# Patient Record
Sex: MALE | URBAN - METROPOLITAN AREA
[De-identification: names, ages, dates, MRNs, and addresses within clinical notes are randomized per-mention and may not be internally consistent; named-entity substitution may affect disease eponyms.]

---

## 2023-03-31 ENCOUNTER — TELEPHONE (OUTPATIENT)
Dept: PRIMARY CARE | Facility: CLINIC | Age: 48
End: 2023-03-31
Payer: COMMERCIAL

## 2023-03-31 NOTE — TELEPHONE ENCOUNTER
Rx Refill Request Telephone Encounter    Name:  Avtar Arias  :  [id number]  Medication Name:  Lisinopril 20mg            Specific Pharmacy location:  [facility]  Date of last appointment:  n/a  Date of next appointment:  n/a  Best number to reach patient:  [phone]

## 2023-04-10 DIAGNOSIS — I10 HYPERTENSION, BENIGN: ICD-10-CM

## 2023-04-11 ENCOUNTER — TELEPHONE (OUTPATIENT)
Dept: PRIMARY CARE | Facility: CLINIC | Age: 48
End: 2023-04-11
Payer: COMMERCIAL

## 2023-04-11 PROBLEM — I10 HYPERTENSION, BENIGN: Status: ACTIVE | Noted: 2023-04-11

## 2023-04-11 RX ORDER — LISINOPRIL 20 MG/1
20 TABLET ORAL DAILY
Qty: 90 TABLET | Refills: 3 | Status: SHIPPED | OUTPATIENT
Start: 2023-04-11 | End: 2024-05-01 | Stop reason: SDUPTHER

## 2023-04-11 RX ORDER — LISINOPRIL 20 MG/1
20 TABLET ORAL DAILY
COMMUNITY
End: 2023-04-11 | Stop reason: SDUPTHER

## 2023-04-11 NOTE — TELEPHONE ENCOUNTER
Pt called wondering if there's an update on refill on Linsiprol 20mg ?  I can get him on the schedule for med refill if need be!  Please advise, thank you!

## 2023-05-01 ENCOUNTER — OFFICE VISIT (OUTPATIENT)
Dept: PRIMARY CARE | Facility: CLINIC | Age: 48
End: 2023-05-01
Payer: COMMERCIAL

## 2023-05-01 VITALS
DIASTOLIC BLOOD PRESSURE: 80 MMHG | RESPIRATION RATE: 16 BRPM | WEIGHT: 214 LBS | SYSTOLIC BLOOD PRESSURE: 140 MMHG | HEART RATE: 76 BPM | BODY MASS INDEX: 26.61 KG/M2 | HEIGHT: 75 IN | TEMPERATURE: 97.9 F

## 2023-05-01 DIAGNOSIS — Z12.11 ENCOUNTER FOR SCREENING FOR MALIGNANT NEOPLASM OF COLON: ICD-10-CM

## 2023-05-01 DIAGNOSIS — I10 HYPERTENSION, BENIGN: Primary | ICD-10-CM

## 2023-05-01 PROBLEM — M41.9 SCOLIOSIS OF THORACOLUMBAR SPINE: Status: ACTIVE | Noted: 2023-05-01

## 2023-05-01 PROCEDURE — 1036F TOBACCO NON-USER: CPT | Performed by: FAMILY MEDICINE

## 2023-05-01 PROCEDURE — 3077F SYST BP >= 140 MM HG: CPT | Performed by: FAMILY MEDICINE

## 2023-05-01 PROCEDURE — 99213 OFFICE O/P EST LOW 20 MIN: CPT | Performed by: FAMILY MEDICINE

## 2023-05-01 PROCEDURE — 3079F DIAST BP 80-89 MM HG: CPT | Performed by: FAMILY MEDICINE

## 2023-05-01 ASSESSMENT — ENCOUNTER SYMPTOMS
BLURRED VISION: 0
SHORTNESS OF BREATH: 0
PALPITATIONS: 0
HEADACHES: 0
HYPERTENSION: 1

## 2023-05-01 NOTE — ASSESSMENT & PLAN NOTE
This is at goal after sitting quietly in the office but he will work on less salt and caffein and increasing exercise

## 2023-05-01 NOTE — PROGRESS NOTES
Subjective   Avtar Arias is a 48 y.o. male who presents for Hypertension.  Hypertension  This is a chronic problem. Progression since onset: stable. The problem is controlled. Pertinent negatives include no anxiety, blurred vision, chest pain, headaches, palpitations, peripheral edema or shortness of breath. Past treatments include ACE inhibitors. The current treatment provides significant improvement. There are no compliance problems.      Review of Systems   Eyes:  Negative for blurred vision.   Respiratory:  Negative for shortness of breath.    Cardiovascular:  Negative for chest pain and palpitations.   Neurological:  Negative for headaches.     Visit Vitals  /80   Pulse 76   Temp 36.6 °C (97.9 °F)   Resp 16      Objective   Physical Exam  Constitutional:       Appearance: Normal appearance.   HENT:      Head: Normocephalic.   Eyes:      Conjunctiva/sclera: Conjunctivae normal.   Cardiovascular:      Rate and Rhythm: Normal rate and regular rhythm.   Pulmonary:      Effort: Pulmonary effort is normal.      Breath sounds: Normal breath sounds.   Musculoskeletal:      Cervical back: Neck supple.   Skin:     General: Skin is warm and dry.   Neurological:      Mental Status: He is alert.         Assessment/Plan    Problem List Items Addressed This Visit       Hypertension, benign - Primary     This is at goal after sitting quietly in the office but he will work on less salt and caffein and increasing exercise         Relevant Orders    CBC    Basic Metabolic Panel    Lipid Panel    Hepatitis C Antibody     Other Visit Diagnoses       Encounter for screening for malignant neoplasm of colon        Relevant Orders    Cologuard® colon cancer screening

## 2024-05-01 ENCOUNTER — TELEPHONE (OUTPATIENT)
Dept: PRIMARY CARE | Facility: CLINIC | Age: 49
End: 2024-05-01
Payer: COMMERCIAL

## 2024-05-01 DIAGNOSIS — I10 HYPERTENSION, BENIGN: ICD-10-CM

## 2024-05-01 RX ORDER — LISINOPRIL 20 MG/1
20 TABLET ORAL DAILY
Qty: 90 TABLET | Refills: 3 | Status: SHIPPED | OUTPATIENT
Start: 2024-05-01

## 2024-05-01 NOTE — TELEPHONE ENCOUNTER
Patient was initially scheduled on 05/02/24 at 9:00am (1 year ago) and had to reschedule due to Dr. Gupta's appt. Schedule change. Patient is now scheduled on 05/31/24, and stated that he has 10 pills left of his Lisinopril, and was inquiring if Dr. Gupta can send a temporary 30 day supply to last him until his appt.? Pharmacy that he would like it sent to is [facility] in [place].  Thank you!

## 2024-05-02 ENCOUNTER — APPOINTMENT (OUTPATIENT)
Dept: PRIMARY CARE | Facility: CLINIC | Age: 49
End: 2024-05-02
Payer: COMMERCIAL

## 2024-05-31 ENCOUNTER — APPOINTMENT (OUTPATIENT)
Dept: PRIMARY CARE | Facility: CLINIC | Age: 49
End: 2024-05-31
Payer: COMMERCIAL

## 2024-08-26 ENCOUNTER — APPOINTMENT (OUTPATIENT)
Dept: PRIMARY CARE | Facility: CLINIC | Age: 49
End: 2024-08-26
Payer: COMMERCIAL

## 2024-09-20 ENCOUNTER — APPOINTMENT (OUTPATIENT)
Dept: PRIMARY CARE | Facility: CLINIC | Age: 49
End: 2024-09-20
Payer: COMMERCIAL

## 2024-11-21 ENCOUNTER — APPOINTMENT (OUTPATIENT)
Dept: PRIMARY CARE | Facility: CLINIC | Age: 49
End: 2024-11-21
Payer: COMMERCIAL

## 2024-12-16 ENCOUNTER — APPOINTMENT (OUTPATIENT)
Dept: PRIMARY CARE | Facility: CLINIC | Age: 49
End: 2024-12-16
Payer: COMMERCIAL

## 2025-01-09 ENCOUNTER — APPOINTMENT (OUTPATIENT)
Dept: PRIMARY CARE | Facility: CLINIC | Age: 50
End: 2025-01-09
Payer: COMMERCIAL

## 2025-03-20 ENCOUNTER — APPOINTMENT (OUTPATIENT)
Dept: PRIMARY CARE | Facility: CLINIC | Age: 50
End: 2025-03-20
Payer: COMMERCIAL

## 2025-05-08 ENCOUNTER — APPOINTMENT (OUTPATIENT)
Dept: PRIMARY CARE | Facility: CLINIC | Age: 50
End: 2025-05-08
Payer: COMMERCIAL

## 2025-06-10 ENCOUNTER — APPOINTMENT (OUTPATIENT)
Dept: PRIMARY CARE | Facility: CLINIC | Age: 50
End: 2025-06-10
Payer: COMMERCIAL

## 2025-07-24 ENCOUNTER — APPOINTMENT (OUTPATIENT)
Dept: PRIMARY CARE | Facility: CLINIC | Age: 50
End: 2025-07-24
Payer: COMMERCIAL

## 2025-09-18 ENCOUNTER — APPOINTMENT (OUTPATIENT)
Dept: PRIMARY CARE | Facility: CLINIC | Age: 50
End: 2025-09-18
Payer: COMMERCIAL